# Patient Record
Sex: FEMALE | Race: WHITE | HISPANIC OR LATINO | URBAN - METROPOLITAN AREA
[De-identification: names, ages, dates, MRNs, and addresses within clinical notes are randomized per-mention and may not be internally consistent; named-entity substitution may affect disease eponyms.]

---

## 2020-11-09 ENCOUNTER — WEB ENCOUNTER (OUTPATIENT)
Dept: URBAN - METROPOLITAN AREA CLINIC 23 | Facility: CLINIC | Age: 25
End: 2020-11-09

## 2020-11-09 ENCOUNTER — OFFICE VISIT (OUTPATIENT)
Dept: URBAN - METROPOLITAN AREA CLINIC 23 | Facility: CLINIC | Age: 25
End: 2020-11-09

## 2020-11-09 ENCOUNTER — OFFICE VISIT (OUTPATIENT)
Dept: URBAN - METROPOLITAN AREA CLINIC 23 | Facility: CLINIC | Age: 25
End: 2020-11-09
Payer: COMMERCIAL

## 2020-11-09 VITALS
HEART RATE: 76 BPM | WEIGHT: 189.8 LBS | BODY MASS INDEX: 30.5 KG/M2 | TEMPERATURE: 96.7 F | DIASTOLIC BLOOD PRESSURE: 74 MMHG | HEIGHT: 66 IN | SYSTOLIC BLOOD PRESSURE: 114 MMHG

## 2020-11-09 DIAGNOSIS — R93.3 ABNORMAL ESOPHAGRAM: ICD-10-CM

## 2020-11-09 DIAGNOSIS — R12 HEARTBURN: ICD-10-CM

## 2020-11-09 DIAGNOSIS — R13.10 DYSPHAGIA: ICD-10-CM

## 2020-11-09 DIAGNOSIS — M35.9 CONNECTIVE TISSUE DISORDER: ICD-10-CM

## 2020-11-09 PROBLEM — 105969002 DISORDER OF CONNECTIVE TISSUE: Status: ACTIVE | Noted: 2020-11-09

## 2020-11-09 PROCEDURE — 74230 X-RAY XM SWLNG FUNCJ C+: CPT | Performed by: INTERNAL MEDICINE

## 2020-11-09 PROCEDURE — 99244 OFF/OP CNSLTJ NEW/EST MOD 40: CPT | Performed by: INTERNAL MEDICINE

## 2020-11-09 PROCEDURE — G8482 FLU IMMUNIZE ORDER/ADMIN: HCPCS | Performed by: INTERNAL MEDICINE

## 2020-11-09 NOTE — HPI-OTHER HISTORIES
- 26 yo female of partial  descent (Citizen of [place]), referred by Dr. Fernando Peña for further evaluation of dysphagia. A copy of this note will be sent to the referring physician.  - Patient reports dysphagia for 2-3 months.  She reports intermittent dysphagia to solid foods.  Denies dysphagia to liquids.  She feels foods sticking in the middle of her chest.  She has to drink water to make the foods go down.  She experiences these symptoms daily.  Her dysphagia is better if she eats slowly and chews her foods well. - She had an abnormal barium esophagram done last month at [facility], which suggested narrowing at the GE junction and additional findings as detailed below.   - She has a h/o myositis/undifferentiated connective tissue disorder.  In the past she has been treated with CellCept and methotrexate.  Recent labs showed improvement in her CPK, off immunomodulator therapy.  Dr. Peña is not recommending resumption of immunomodulator therapy at this time. - Up until about 2 years ago, she had been taking omeprazole 20 mg daily for about 6 years.  She was taking it for heartburn and dyspepsia symptoms while being treated with steroids.  She quit taking omeprazole about 2 years ago.  Now she may get heartburn or dyspepsia symptoms only about once a month, depending on what she eats, and she does not need to take anything for these symptoms as they are self-limited. - She takes Excedrin about every 2 months for headaches - Denies family history of GI malignancies in any first degree relatives  Summary of previous workup: - Barium esophagram done at [facility] on 10/5/2020: 1.  The distal thoracic esophagus is patulous just proximal to the GE junction with stasis of contrast. This suggests narrowing at the gastroesophageal junction. The GE junction remains patent and a 13 mm tablet traverses the GE junction without difficulty. Furthermore, the distal thoracic esophagus has a striated/ringlike morphology of the mucosa. Collectively, these findings are favored to relate to the patient's reported myositis. An alternative differential consideration is eosinophilic esophagitis given morphology. Recommend correlation with endoscopy. 2.  Small hiatal hernia with small volume gastroesophageal reflux

## 2020-11-17 ENCOUNTER — TELEPHONE ENCOUNTER (OUTPATIENT)
Dept: URBAN - METROPOLITAN AREA CLINIC 23 | Facility: CLINIC | Age: 25
End: 2020-11-17

## 2020-11-19 ENCOUNTER — TELEPHONE ENCOUNTER (OUTPATIENT)
Dept: URBAN - METROPOLITAN AREA CLINIC 23 | Facility: CLINIC | Age: 25
End: 2020-11-19

## 2020-12-03 ENCOUNTER — TELEPHONE ENCOUNTER (OUTPATIENT)
Dept: URBAN - METROPOLITAN AREA CLINIC 23 | Facility: CLINIC | Age: 25
End: 2020-12-03

## 2021-01-06 ENCOUNTER — OFFICE VISIT (OUTPATIENT)
Dept: URBAN - METROPOLITAN AREA LAB 3 | Facility: LAB | Age: 26
End: 2021-01-06
Payer: COMMERCIAL

## 2021-01-06 ENCOUNTER — TELEPHONE ENCOUNTER (OUTPATIENT)
Dept: URBAN - METROPOLITAN AREA CLINIC 92 | Facility: CLINIC | Age: 26
End: 2021-01-06

## 2021-01-06 DIAGNOSIS — K31.7 BENIGN GASTRIC POLYP: ICD-10-CM

## 2021-01-06 DIAGNOSIS — K29.60 ADENOPAPILLOMATOSIS GASTRICA: ICD-10-CM

## 2021-01-06 DIAGNOSIS — K22.8 COLUMNAR-LINED ESOPHAGUS: ICD-10-CM

## 2021-01-06 PROBLEM — 40739000 DYSPHAGIA: Status: ACTIVE | Noted: 2020-11-09

## 2021-01-06 PROBLEM — 40719004 EROSIVE ESOPHAGITIS: Status: ACTIVE | Noted: 2021-01-06

## 2021-01-06 PROCEDURE — 43239 EGD BIOPSY SINGLE/MULTIPLE: CPT | Performed by: INTERNAL MEDICINE

## 2021-01-06 RX ORDER — PANTOPRAZOLE SODIUM 40 MG/1
1 TABLET, 30 MINUTES BEFORE BREAKFAST AND DINNER TABLET, DELAYED RELEASE ORAL TWICE A DAY
Qty: 180 | Refills: 3 | OUTPATIENT
Start: 2021-01-06

## 2021-01-06 RX ORDER — FOLIC ACID 1 MG
TABLET ORAL
Qty: 0 | Refills: 0 | Status: ON HOLD | COMMUNITY
Start: 2015-11-20

## 2021-01-06 RX ORDER — PREDNISONE 5 MG/1
TABLET ORAL
Qty: 0 | Refills: 0 | Status: ON HOLD | COMMUNITY
Start: 2015-10-06

## 2021-01-06 RX ORDER — METHOTREXATE SODIUM 2.5 MG/1
TABLET ORAL
Qty: 0 | Refills: 0 | Status: ON HOLD | COMMUNITY
Start: 2015-11-12

## 2021-01-06 RX ORDER — FAMOTIDINE 40 MG/1
1 TABLET AT BEDTIME TABLET ORAL ONCE A DAY
Qty: 90 TABLETS | Refills: 3 | OUTPATIENT
Start: 2021-01-06

## 2021-01-20 ENCOUNTER — OFFICE VISIT (OUTPATIENT)
Dept: URBAN - METROPOLITAN AREA LAB 3 | Facility: LAB | Age: 26
End: 2021-01-20

## 2021-01-26 ENCOUNTER — TELEPHONE ENCOUNTER (OUTPATIENT)
Dept: URBAN - METROPOLITAN AREA CLINIC 78 | Facility: CLINIC | Age: 26
End: 2021-01-26

## 2021-02-23 ENCOUNTER — WEB ENCOUNTER (OUTPATIENT)
Dept: URBAN - METROPOLITAN AREA CLINIC 23 | Facility: CLINIC | Age: 26
End: 2021-02-23

## 2024-02-15 ENCOUNTER — OV NP (OUTPATIENT)
Dept: URBAN - NONMETROPOLITAN AREA CLINIC 2 | Facility: CLINIC | Age: 29
End: 2024-02-15
Payer: COMMERCIAL

## 2024-02-15 VITALS
HEIGHT: 66 IN | BODY MASS INDEX: 32.47 KG/M2 | SYSTOLIC BLOOD PRESSURE: 104 MMHG | HEART RATE: 87 BPM | DIASTOLIC BLOOD PRESSURE: 71 MMHG | WEIGHT: 202 LBS

## 2024-02-15 DIAGNOSIS — R19.5 LOOSE STOOLS: ICD-10-CM

## 2024-02-15 DIAGNOSIS — K20.0 EOSINOPHILIC ESOPHAGITIS: ICD-10-CM

## 2024-02-15 PROBLEM — 235599003: Status: ACTIVE | Noted: 2024-02-15

## 2024-02-15 PROCEDURE — 99214 OFFICE O/P EST MOD 30 MIN: CPT | Performed by: NURSE PRACTITIONER

## 2024-02-15 RX ORDER — PANTOPRAZOLE SODIUM 40 MG/1
1 TABLET, 30 MINUTES BEFORE BREAKFAST AND DINNER TABLET, DELAYED RELEASE ORAL TWICE A DAY
Qty: 180 | Refills: 3 | Status: ON HOLD | COMMUNITY
Start: 2021-01-06

## 2024-02-15 RX ORDER — DICYCLOMINE HYDROCHLORIDE 10 MG/1
1 CAPSULES CAPSULE ORAL
Qty: 60 | Refills: 6 | OUTPATIENT
Start: 2024-02-15 | End: 2024-09-12

## 2024-02-15 RX ORDER — FOLIC ACID 1 MG
TABLET ORAL
Qty: 0 | Refills: 0 | Status: ON HOLD | COMMUNITY
Start: 2015-11-20

## 2024-02-15 RX ORDER — PREDNISONE 5 MG/1
TABLET ORAL
Qty: 0 | Refills: 0 | Status: ON HOLD | COMMUNITY
Start: 2015-10-06

## 2024-02-15 RX ORDER — FAMOTIDINE 40 MG/1
1 TABLET AT BEDTIME TABLET ORAL ONCE A DAY
Qty: 90 TABLETS | Refills: 3 | Status: ON HOLD | COMMUNITY
Start: 2021-01-06

## 2024-02-15 RX ORDER — METHOTREXATE SODIUM 2.5 MG/1
TABLET ORAL
Qty: 0 | Refills: 0 | Status: ON HOLD | COMMUNITY
Start: 2015-11-12

## 2024-02-15 NOTE — HPI-TODAY'S VISIT:
Patient is a pleasant 29-year-old female who is currently 34 weeks pregnant who presents with diarrhea.  Symptoms been ongoing for 2 weeks.  She is actually mainly been having loose watery diarrhea at night with no abdominal pain.  Symptoms seem pretty well-managed during the day. Something similar happened to her a few years back.  She did up seeing a PCP who did some sort of allergy panel and told her that she had an intolerance to egg and wheat and so she has been avoiding these foods.  However, she has been avoiding her these foods and she is still having diarrhea now. She was actually seen by another one of her offices in 2021 for some dysphagia and reflux complaints she actually had an EGD with small hiatal hernia, class D esophagitis, and acidophil's present on biopsy with a count up to 50.  No EGD since. She is followed locally by Dr. Peña.  There was a question of mixed connective tissue disorder, but a lot of her testing was inconclusive.  She is off methotrexate and doing well at this time sb

## 2024-02-20 LAB
ADENOVIRUS F 40/41: NOT DETECTED
CALPROTECTIN, STOOL - QDX: (no result)
CAMPYLOBACTER: NOT DETECTED
CLOSTRIDIUM DIFFICILE: NOT DETECTED
ENTAMOEBA HISTOLYTICA: NOT DETECTED
ENTEROAGGREGATIVE E.COLI: NOT DETECTED
ENTEROTOXIGENIC E.COLI: NOT DETECTED
ESCHERICHIA COLI O157: NOT DETECTED
FECAL FAT, QUALITATIVE: (no result)
GIARDIA LAMBLIA: NOT DETECTED
H. PYLORI (EIA) - QDX: NEGATIVE
NOROVIRUS GI/GII: NOT DETECTED
PANCREATICELASTASE ELISA, STOOL: (no result)
ROTAVIRUS A: NOT DETECTED
SALMONELLA SPP.: NOT DETECTED
SHIGA-LIKE TOXIN PRODUCING E.COLI: NOT DETECTED
SHIGELLA SPP. / ENTEROINVASIVE E.COLI: NOT DETECTED
VIBRIO PARAHAEMOLYTICUS: NOT DETECTED
VIBRIO SPP.: NOT DETECTED
YERSINIA ENTEROCOLITICA: NOT DETECTED

## 2024-03-14 ENCOUNTER — OV EP (OUTPATIENT)
Dept: URBAN - NONMETROPOLITAN AREA CLINIC 2 | Facility: CLINIC | Age: 29
End: 2024-03-14

## 2024-05-07 ENCOUNTER — OFFICE VISIT (OUTPATIENT)
Dept: URBAN - NONMETROPOLITAN AREA CLINIC 2 | Facility: CLINIC | Age: 29
End: 2024-05-07

## 2024-06-24 ENCOUNTER — OFFICE VISIT (OUTPATIENT)
Dept: URBAN - NONMETROPOLITAN AREA CLINIC 2 | Facility: CLINIC | Age: 29
End: 2024-06-24

## 2024-07-05 ENCOUNTER — LAB OUTSIDE AN ENCOUNTER (OUTPATIENT)
Dept: URBAN - NONMETROPOLITAN AREA CLINIC 2 | Facility: CLINIC | Age: 29
End: 2024-07-05

## 2024-07-05 ENCOUNTER — DASHBOARD ENCOUNTERS (OUTPATIENT)
Age: 29
End: 2024-07-05

## 2024-07-05 ENCOUNTER — OFFICE VISIT (OUTPATIENT)
Dept: URBAN - NONMETROPOLITAN AREA CLINIC 2 | Facility: CLINIC | Age: 29
End: 2024-07-05
Payer: COMMERCIAL

## 2024-07-05 VITALS
BODY MASS INDEX: 30.53 KG/M2 | SYSTOLIC BLOOD PRESSURE: 118 MMHG | HEART RATE: 88 BPM | HEIGHT: 66 IN | WEIGHT: 190 LBS | DIASTOLIC BLOOD PRESSURE: 75 MMHG

## 2024-07-05 DIAGNOSIS — K20.0 EOSINOPHILIC ESOPHAGITIS: ICD-10-CM

## 2024-07-05 DIAGNOSIS — R19.7 ACUTE DIARRHEA: ICD-10-CM

## 2024-07-05 PROCEDURE — 99213 OFFICE O/P EST LOW 20 MIN: CPT | Performed by: NURSE PRACTITIONER

## 2024-07-05 RX ORDER — FAMOTIDINE 40 MG/1
1 TABLET AT BEDTIME TABLET ORAL ONCE A DAY
Qty: 90 TABLETS | Refills: 3 | Status: ON HOLD | COMMUNITY
Start: 2021-01-06

## 2024-07-05 RX ORDER — FOLIC ACID 1 MG
TABLET ORAL
Qty: 0 | Refills: 0 | Status: ON HOLD | COMMUNITY
Start: 2015-11-20

## 2024-07-05 RX ORDER — METHOTREXATE SODIUM 2.5 MG/1
TABLET ORAL
Qty: 0 | Refills: 0 | Status: ON HOLD | COMMUNITY
Start: 2015-11-12

## 2024-07-05 RX ORDER — PREDNISONE 5 MG/1
TABLET ORAL
Qty: 0 | Refills: 0 | Status: ON HOLD | COMMUNITY
Start: 2015-10-06

## 2024-07-05 RX ORDER — PANTOPRAZOLE SODIUM 40 MG/1
1 TABLET, 30 MINUTES BEFORE BREAKFAST AND DINNER TABLET, DELAYED RELEASE ORAL TWICE A DAY
Qty: 180 | Refills: 3 | Status: ON HOLD | COMMUNITY
Start: 2021-01-06

## 2024-07-05 RX ORDER — DICYCLOMINE HYDROCHLORIDE 10 MG/1
1 CAPSULES CAPSULE ORAL
Qty: 60 | Refills: 6 | Status: ON HOLD | COMMUNITY
Start: 2024-02-15 | End: 2024-09-12

## 2024-07-05 NOTE — HPI-OTHER HISTORIES
2/2024: Patient is a pleasant 29-year-old female who is currently 34 weeks pregnant who presents with diarrhea. Symptoms been ongoing for 2 weeks. She is actually mainly been having loose watery diarrhea at night with no abdominal pain. Symptoms seem pretty well-managed during the day. Something similar happened to her a few years back. She did up seeing a PCP who did some sort of allergy panel and told her that she had an intolerance to egg and wheat and so she has been avoiding these foods. However, she has been avoiding her these foods and she is still having diarrhea now. She was actually seen by another one of her offices in 2021 for some dysphagia and reflux complaints she actually had an EGD with small hiatal hernia, class D esophagitis, and acidophil's present on biopsy with a count up to 50. No EGD since. She is followed locally by Dr. Peña. There was a question of mixed connective tissue disorder, but a lot of her testing was inconclusive. She is off methotrexate and doing well at this time sb

## 2024-07-05 NOTE — HPI-TODAY'S VISIT:
Ms. Will returns for follow-up of loose stools.  Since her last visit she gave birth to a healthy baby girl who is now 14 weeks old.  Unfortunately Jennifer continues to experience nighttime diarrhea.  She denies any significant pain but sometimes has to use the bathroom up to 3 times per night.  She was prescribed Bentyl at her last office visit and was also told to use Imodium as needed but she did not take either as she felt her symptoms were tolerable. Did have one episode of blood noted on stool a few nights ago. This has not recurred. Weight is decreasing thought secondary to postpartum. Understands indication to proceed with colonoscopy to further eval nighttime complaints.  She is interested in resuming egg and gluten oral intake and having EGD performed as well.  LG.

## 2024-08-26 ENCOUNTER — CLAIMS CREATED FROM THE CLAIM WINDOW (OUTPATIENT)
Dept: URBAN - NONMETROPOLITAN AREA SURGERY CENTER 1 | Facility: SURGERY CENTER | Age: 29
End: 2024-08-26

## 2024-08-26 RX ORDER — PREDNISONE 5 MG/1
TABLET ORAL
Qty: 0 | Refills: 0 | Status: ON HOLD | COMMUNITY
Start: 2015-10-06

## 2024-08-26 RX ORDER — METHOTREXATE SODIUM 2.5 MG/1
TABLET ORAL
Qty: 0 | Refills: 0 | Status: ON HOLD | COMMUNITY
Start: 2015-11-12

## 2024-08-26 RX ORDER — FAMOTIDINE 40 MG/1
1 TABLET AT BEDTIME TABLET ORAL ONCE A DAY
Qty: 90 TABLETS | Refills: 3 | Status: ON HOLD | COMMUNITY
Start: 2021-01-06

## 2024-08-26 RX ORDER — DICYCLOMINE HYDROCHLORIDE 10 MG/1
1 CAPSULES CAPSULE ORAL
Qty: 60 | Refills: 6 | Status: ON HOLD | COMMUNITY
Start: 2024-02-15 | End: 2024-09-12

## 2024-08-26 RX ORDER — FOLIC ACID 1 MG
TABLET ORAL
Qty: 0 | Refills: 0 | Status: ON HOLD | COMMUNITY
Start: 2015-11-20

## 2024-08-26 RX ORDER — PANTOPRAZOLE SODIUM 40 MG/1
1 TABLET, 30 MINUTES BEFORE BREAKFAST AND DINNER TABLET, DELAYED RELEASE ORAL TWICE A DAY
Qty: 180 | Refills: 3 | Status: ON HOLD | COMMUNITY
Start: 2021-01-06

## 2024-09-16 ENCOUNTER — OFFICE VISIT (OUTPATIENT)
Dept: URBAN - NONMETROPOLITAN AREA CLINIC 2 | Facility: CLINIC | Age: 29
End: 2024-09-16
Payer: COMMERCIAL

## 2024-09-16 VITALS
HEIGHT: 66 IN | HEART RATE: 91 BPM | DIASTOLIC BLOOD PRESSURE: 78 MMHG | SYSTOLIC BLOOD PRESSURE: 111 MMHG | BODY MASS INDEX: 31.15 KG/M2 | WEIGHT: 193.8 LBS

## 2024-09-16 DIAGNOSIS — K44.9 DIAPHRAGMATIC HERNIA WITHOUT OBSTRUCTION OR GANGRENE: ICD-10-CM

## 2024-09-16 DIAGNOSIS — R19.7 DIARRHEA, UNSPECIFIED: ICD-10-CM

## 2024-09-16 DIAGNOSIS — K22.89 OTHER SPECIFIED DISEASE OF ESOPHAGUS: ICD-10-CM

## 2024-09-16 DIAGNOSIS — R10.13 DYSPEPSIA: ICD-10-CM

## 2024-09-16 PROBLEM — 72007001: Status: ACTIVE | Noted: 2024-09-16

## 2024-09-16 PROCEDURE — 99214 OFFICE O/P EST MOD 30 MIN: CPT | Performed by: NURSE PRACTITIONER

## 2024-09-16 RX ORDER — FOLIC ACID 1 MG
TABLET ORAL
Qty: 0 | Refills: 0 | Status: ON HOLD | COMMUNITY
Start: 2015-11-20

## 2024-09-16 RX ORDER — FAMOTIDINE 40 MG/1
1 TABLET AT BEDTIME TABLET ORAL ONCE A DAY
Qty: 90 TABLETS | Refills: 3 | Status: ON HOLD | COMMUNITY
Start: 2021-01-06

## 2024-09-16 RX ORDER — PANTOPRAZOLE SODIUM 40 MG/1
1 TABLET, 30 MINUTES BEFORE BREAKFAST AND DINNER TABLET, DELAYED RELEASE ORAL TWICE A DAY
Qty: 180 | Refills: 3 | Status: ON HOLD | COMMUNITY
Start: 2021-01-06

## 2024-09-16 RX ORDER — OMEPRAZOLE 40 MG/1
1 CAPSULE 30 MINUTES BEFORE MORNING MEAL CAPSULE, DELAYED RELEASE ORAL ONCE A DAY
Qty: 90 | Refills: 3 | OUTPATIENT
Start: 2024-09-16

## 2024-09-16 RX ORDER — METHOTREXATE SODIUM 2.5 MG/1
TABLET ORAL
Qty: 0 | Refills: 0 | Status: ON HOLD | COMMUNITY
Start: 2015-11-12

## 2024-09-16 RX ORDER — PREDNISONE 5 MG/1
TABLET ORAL
Qty: 0 | Refills: 0 | Status: ON HOLD | COMMUNITY
Start: 2015-10-06

## 2024-09-16 NOTE — HPI-OTHER HISTORIES
2/2024: Patient is a pleasant 29-year-old female who is currently 34 weeks pregnant who presents with diarrhea. Symptoms been ongoing for 2 weeks. She is actually mainly been having loose watery diarrhea at night with no abdominal pain. Symptoms seem pretty well-managed during the day. Something similar happened to her a few years back. She did up seeing a PCP who did some sort of allergy panel and told her that she had an intolerance to egg and wheat and so she has been avoiding these foods. However, she has been avoiding her these foods and she is still having diarrhea now. She was actually seen by another one of her offices in 2021 for some dysphagia and reflux complaints she actually had an EGD with small hiatal hernia, class D esophagitis, and acidophil's present on biopsy with a count up to 50. No EGD since. She is followed locally by Dr. Peña. There was a question of mixed connective tissue disorder, but a lot of her testing was inconclusive. She is off methotrexate and doing well at this time sb  7/5/2024: Ms. Will returns for follow-up of loose stools. Since her last visit she gave birth to a healthy baby girl who is now 14 weeks old. Unfortunately Jennifer continues to experience nighttime diarrhea. She denies any significant pain but sometimes has to use the bathroom up to 3 times per night. She was prescribed Bentyl at her last office visit and was also told to use Imodium as needed but she did not take either as she felt her symptoms were tolerable. Did have one episode of blood noted on stool a few nights ago. This has not recurred. Weight is decreasing thought secondary to postpartum. Understands indication to proceed with colonoscopy to further eval nighttime complaints. She is interested in resuming egg and gluten oral intake and having EGD performed as well. LG.

## 2024-09-16 NOTE — HPI-TODAY'S VISIT:
Ms. Jennifer Will is a 29-year-old female who presents today for follow-up of loose stools occurring postpartum and history of EOE.  She had a colonoscopy that was entirely normal.  Recommendation is to repeat again at the age of 47.  She also had an endoscopy where a 2 cm hiatal hernia was noted.  Changes suspicious for eosinophilic esophagitis were also found, and this was confirmed on pathology report with greater than 40 eosinophils per high-powered field noted.  Slight chronic gastritis as well as focal active duodenitis was also noticed.  No H. pylori was found.  Today Jennifer reports resolution of her loose stools since reintroducing gluten and eggs into her diet.  She would like to an injection for her EOE if possible.  We discussed trialing a PPI and then increasing to budesonide pending symptom improvement.  She agrees with this plan.  LG. This is a recent snapshot of the patient's [facility] medical record.  For current drug dose and complete information and questions, call [phone] or [facility] ([id number])  CSN Number:  [id number]

## 2024-09-18 LAB
DEAMIDATED GLIADIN ABS, IGA: 7
DEAMIDATED GLIADIN ABS, IGG: 3
ENDOMYSIAL ANTIBODY IGA: NEGATIVE
IMMUNOGLOBULIN A, QN, SERUM: 255
T-TRANSGLUTAMINASE (TTG) IGA: <2
T-TRANSGLUTAMINASE (TTG) IGG: <2

## 2025-01-17 ENCOUNTER — OFFICE VISIT (OUTPATIENT)
Dept: URBAN - NONMETROPOLITAN AREA CLINIC 2 | Facility: CLINIC | Age: 30
End: 2025-01-17